# Patient Record
Sex: FEMALE | Race: WHITE | Employment: OTHER | URBAN - METROPOLITAN AREA
[De-identification: names, ages, dates, MRNs, and addresses within clinical notes are randomized per-mention and may not be internally consistent; named-entity substitution may affect disease eponyms.]

---

## 2017-02-08 ENCOUNTER — HOSPITAL ENCOUNTER (OUTPATIENT)
Dept: MAMMOGRAPHY | Age: 62
Discharge: HOME | End: 2017-02-08
Attending: OBSTETRICS & GYNECOLOGY
Payer: COMMERCIAL

## 2017-02-08 DIAGNOSIS — Z12.31 VISIT FOR SCREENING MAMMOGRAM: ICD-10-CM

## 2017-02-08 PROCEDURE — 77063 BREAST TOMOSYNTHESIS BI: CPT

## 2017-02-16 NOTE — PATIENT DISCUSSION
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   RETURN FOR FOLLOW-UP AS SCHEDULED FOR DILATED EYE EXAM.

## 2017-02-16 NOTE — PATIENT DISCUSSION
POSTERIOR VITREOUS DETACHMENT OD: NO HOLES, TEARS, OR RETINAL DETACHMENTS TODAY. ADVISED PT TO CALL IF ANY FLASHES OF LIGHT, INCREASE IN FLOATERS, OR DECREASE VISION IN EITHER EYE.

## 2017-04-04 ENCOUNTER — IMPORTED ENCOUNTER (OUTPATIENT)
Dept: URBAN - METROPOLITAN AREA CLINIC 1 | Facility: CLINIC | Age: 62
End: 2017-04-04

## 2017-04-04 PROBLEM — H16.143: Noted: 2017-04-04

## 2017-04-04 PROBLEM — H40.023: Noted: 2017-04-04

## 2017-04-04 PROBLEM — H25.813: Noted: 2017-04-04

## 2017-04-04 PROBLEM — H04.123: Noted: 2017-04-04

## 2017-04-04 PROBLEM — H35.3131: Noted: 2017-04-04

## 2017-04-04 PROCEDURE — 92004 COMPRE OPH EXAM NEW PT 1/>: CPT

## 2017-04-04 PROCEDURE — 92250 FUNDUS PHOTOGRAPHY W/I&R: CPT

## 2017-04-04 NOTE — PATIENT DISCUSSION
1.  Conjunctivitis OD (Chronic)- partial NLD obstruction. Start Tobradex OD TID. Continue Refresh tears OU TID. D/C Gilmar Soliman (prescribed else where.)  2. MEG w/ PEK OU: The use of artificial tears OU TID were recommended. D/C Gilmar Soliman (prescribed else where.)  3. Cataract OU: Observe for now without intervention. The patient was advised to contact us if any change or worsening of vision4. ARMD OU early/dry/NEW/ () FHX- mother. Importance of daily AREDS II study multivitamin and Amsler Grid checks discussed with patient. Patient to follow-up immediately with any new onset of decreased vision and/or metamorphopsia. 5. Glaucoma Suspect OU (0.8/0.75/ () FHX- mother): Stable IOP OU. Asymmetric cupping. Baseline disc photos taken today. Patient is considered high risk. Condition was discussed with patient and patient understands. Will continue to monitor patient for any progression in condition. Patient was advised to call us with any problems questions or concerns. 6.  H/o Lasik OU ([facility]): Done else where7. Return for an appointment for a 10/OCT/HVf in 3 weeks with Dr. Maryjo Mendenhall.

## 2017-05-09 ENCOUNTER — IMPORTED ENCOUNTER (OUTPATIENT)
Dept: URBAN - METROPOLITAN AREA CLINIC 1 | Facility: CLINIC | Age: 62
End: 2017-05-09

## 2017-05-09 PROBLEM — H40.023: Noted: 2017-05-09

## 2017-05-09 PROBLEM — H10.31: Noted: 2017-05-09

## 2017-05-09 PROBLEM — H16.143: Noted: 2017-05-09

## 2017-05-09 PROBLEM — H25.813: Noted: 2017-05-09

## 2017-05-09 PROBLEM — H04.123: Noted: 2017-05-09

## 2017-05-09 PROCEDURE — 92133 CPTRZD OPH DX IMG PST SGM ON: CPT

## 2017-05-09 PROCEDURE — 92083 EXTENDED VISUAL FIELD XM: CPT

## 2017-05-09 PROCEDURE — 92012 INTRM OPH EXAM EST PATIENT: CPT

## 2017-05-09 NOTE — PATIENT DISCUSSION
1.  Conjunctivitis OD (Chronic)- partial NLD obstruction resolved. D/c Tobradex. 2.  MEG w/ PEK OU and recent episodic reflex tearing- Increase ATs to TID OU routinely. Can increase to Q1hr OU w/a while on the computer. 3.  Cataract OU: Observe for now without intervention. The patient was advised to contact us if any change or worsening of vision4. H/o ARMD OU Early/Dry5. Glaucoma Suspect OU (0.8/0.75/ () FHX- Mother): Stable IOP OU. OCT/ VF WNL OU today. Continue to observe on no  meds. Asymmetric cupping. Patient considered High Risk. 6.  H/o L[facility] OU ([facility]): Done elsewhereReturn for an appointment in October 10 dfe with Dr. Mabel Arnett.

## 2017-10-24 ENCOUNTER — IMPORTED ENCOUNTER (OUTPATIENT)
Dept: URBAN - METROPOLITAN AREA CLINIC 1 | Facility: CLINIC | Age: 62
End: 2017-10-24

## 2017-10-24 PROBLEM — H25.813: Noted: 2017-10-24

## 2017-10-24 PROBLEM — H10.45: Noted: 2017-10-24

## 2017-10-24 PROBLEM — H35.363: Noted: 2017-10-24

## 2017-10-24 PROBLEM — H35.3131: Noted: 2017-10-24

## 2017-10-24 PROBLEM — H04.123: Noted: 2017-10-24

## 2017-10-24 PROBLEM — H40.023: Noted: 2017-10-24

## 2017-10-24 PROBLEM — H16.143: Noted: 2017-10-24

## 2017-10-24 PROCEDURE — 92012 INTRM OPH EXAM EST PATIENT: CPT

## 2017-10-24 PROCEDURE — 92015 DETERMINE REFRACTIVE STATE: CPT

## 2017-10-24 NOTE — PATIENT DISCUSSION
1.  Cataract OU: Visually Significant secondary to glare but will observe w/o intervention at this time. 2.  ARMD OU early/dry/stable. Importance of daily AREDS II study multivitamin and Amsler Grid checks discussed with patient. Patient to follow-up immediately with any new onset of decreased vision and/or metamorphopsia. 3. Glaucoma Suspect OU (0.8/0.75/ () FHX): Stable IOP and C/D OU. Past w/u (-). Patient is considered high risk. 4.  MEG w/ PEK OU- Stable. The use of artificial tears OU TID were recommended routinely. (Prescribed Doraine Shell else where in past.)5. Allergic Conjunctivitis OU- The condition was  discussed with the patient. Avoidance of allergens and cool compresses were recommended. 6. [facility]- [facility]. Pt interested in CTL's- Recommend daily CTL's. Will refer to Dr. Bradly Callaway for CTL fit. Recommend pt increase tears OU to QID while in CTLs. 8. Return for an appointment for a 30/OCT in 1 year with Dr. Marcy Mix.

## 2017-10-31 ENCOUNTER — IMPORTED ENCOUNTER (OUTPATIENT)
Dept: URBAN - METROPOLITAN AREA CLINIC 1 | Facility: CLINIC | Age: 62
End: 2017-10-31

## 2017-11-15 ENCOUNTER — IMPORTED ENCOUNTER (OUTPATIENT)
Dept: URBAN - METROPOLITAN AREA CLINIC 1 | Facility: CLINIC | Age: 62
End: 2017-11-15

## 2017-11-15 NOTE — PATIENT DISCUSSION
1.  CC today - wanting to improve comfort and reading vision OS. New trials given. Return for an appointment in 1 week cc after trial  with Dr. Micheline Patel.

## 2017-12-20 ENCOUNTER — IMPORTED ENCOUNTER (OUTPATIENT)
Dept: URBAN - METROPOLITAN AREA CLINIC 1 | Facility: CLINIC | Age: 62
End: 2017-12-20

## 2017-12-20 NOTE — PATIENT DISCUSSION
1.  CC today - Will order Acuvue Oasys 1 Day Toric CTL trials to improve comfort. Patient happy with vision Return for an appointment in 1-2 week cc after trial  with Dr. Octaviano Yao.

## 2018-03-30 ENCOUNTER — HOSPITAL ENCOUNTER (OUTPATIENT)
Dept: MAMMOGRAPHY | Age: 63
Discharge: HOME | End: 2018-03-30
Attending: OBSTETRICS & GYNECOLOGY
Payer: COMMERCIAL

## 2018-03-30 DIAGNOSIS — Z12.31 ENCOUNTER FOR SCREENING MAMMOGRAM FOR MALIGNANT NEOPLASM OF BREAST: ICD-10-CM

## 2018-03-30 PROCEDURE — 77063 BREAST TOMOSYNTHESIS BI: CPT

## 2018-12-18 ENCOUNTER — IMPORTED ENCOUNTER (OUTPATIENT)
Dept: URBAN - METROPOLITAN AREA CLINIC 1 | Facility: CLINIC | Age: 63
End: 2018-12-18

## 2018-12-18 PROBLEM — H40.013: Noted: 2018-12-18

## 2018-12-18 PROBLEM — H35.3131: Noted: 2018-12-18

## 2018-12-18 PROCEDURE — 92133 CPTRZD OPH DX IMG PST SGM ON: CPT

## 2018-12-18 PROCEDURE — 92014 COMPRE OPH EXAM EST PT 1/>: CPT

## 2018-12-18 PROCEDURE — 92015 DETERMINE REFRACTIVE STATE: CPT

## 2018-12-18 NOTE — PATIENT DISCUSSION
1.  ARMD OU Early/dry/stable. Importance of daily AREDS II study multivitamin and Amsler Grid checks discussed with patient. Patient to follow-up immediately with any new onset of decreased vision and/or metamorphopsia. 2. Glaucoma Suspect OU (0.8/0.75/ () FHX): Stable IOP and C/D OU. Past w/u (-). OCT shows no progression OU. Cont to observe on no gtts. Patient is considered high risk. 3.  MEG w/ PEK OU- Use ATs TID OU Routinely. 4.  Allergic Conjunctivitis OU- controlled. Observe. 5.  [facility] OU- [facility] 6. H/o CL Wear- Could consider Dailies Total One PRN. Letter to PCP Return for an appointment in 6 mo 10 dfe with Dr. Cody Sheikh.

## 2019-01-15 NOTE — PATIENT DISCUSSION
New Prescription: erythromycin (erythromycin): ointment: 5 mg/gram (0.5 %) a small amount twice a day as directed into both eyes 01-

## 2019-01-15 NOTE — PATIENT DISCUSSION
Cosmetic Dermatochalasis ou: Not visually significant enough for insurance to cover surgery. Patient understands Blepharoplasty will be preformed for cosmetic reasons and understands payment is an out of pocket expense. Patient elects to proceed with Blepharoplasty, discussed with patient risks, benefits, inflammation, infection and bleeding.

## 2019-04-24 ENCOUNTER — HOSPITAL ENCOUNTER (OUTPATIENT)
Dept: MAMMOGRAPHY | Age: 64
Discharge: HOME | End: 2019-04-24
Attending: OBSTETRICS & GYNECOLOGY
Payer: COMMERCIAL

## 2019-04-24 DIAGNOSIS — Z12.31 VISIT FOR SCREENING MAMMOGRAM: ICD-10-CM

## 2019-04-24 PROCEDURE — 77063 BREAST TOMOSYNTHESIS BI: CPT

## 2019-06-28 ENCOUNTER — IMPORTED ENCOUNTER (OUTPATIENT)
Dept: URBAN - METROPOLITAN AREA CLINIC 1 | Facility: CLINIC | Age: 64
End: 2019-06-28

## 2019-06-28 PROBLEM — H40.023: Noted: 2019-06-28

## 2019-06-28 PROBLEM — H35.3131: Noted: 2019-06-28

## 2019-06-28 PROCEDURE — 92012 INTRM OPH EXAM EST PATIENT: CPT

## 2019-06-28 NOTE — PATIENT DISCUSSION
1.  ARMD OU Early/dry/stable. Importance of daily AREDS II study multivitamin and Amsler Grid checks discussed with patient. Patient to follow-up immediately with any new onset of decreased vision and/or metamorphopsia. 2. Glaucoma Suspect OU (0.8/0.70/ () FHX. IOP stable on no gtts. Past w/u negative. Cont to observe off gtts. 3.  MEG w/ PEK OU- Continue ATs TID OU Routinely. 4.  Allergic Conjunctivitis OU- controlled. Observe. 5.  H/o LASIK OU- [facility] 6. H/o CL Wear- Could consider Dailies Total One PRN. Return for an appointment in 6 mo 30 with Dr. Elva Garsia.

## 2019-08-09 ENCOUNTER — HOSPITAL ENCOUNTER (OUTPATIENT)
Dept: LAB | Age: 64
Discharge: HOME | End: 2019-08-09
Payer: COMMERCIAL

## 2019-08-09 ENCOUNTER — HOSPITAL ENCOUNTER (OUTPATIENT)
Dept: LAB | Age: 64
Discharge: HOME | End: 2019-08-09

## 2019-08-09 DIAGNOSIS — N39.3 FEMALE STRESS INCONTINENCE: ICD-10-CM

## 2019-08-09 LAB — SENTARA SPECIMEN COL,SENBCF: NORMAL

## 2019-08-09 PROCEDURE — 93005 ELECTROCARDIOGRAM TRACING: CPT

## 2019-08-09 PROCEDURE — 36415 COLL VENOUS BLD VENIPUNCTURE: CPT

## 2019-08-09 PROCEDURE — 99001 SPECIMEN HANDLING PT-LAB: CPT

## 2019-08-11 LAB
ATRIAL RATE: 66 BPM
CALCULATED P AXIS, ECG09: 63 DEGREES
CALCULATED R AXIS, ECG10: -48 DEGREES
CALCULATED T AXIS, ECG11: 66 DEGREES
DIAGNOSIS, 93000: NORMAL
P-R INTERVAL, ECG05: 146 MS
Q-T INTERVAL, ECG07: 422 MS
QRS DURATION, ECG06: 86 MS
QTC CALCULATION (BEZET), ECG08: 442 MS
VENTRICULAR RATE, ECG03: 66 BPM

## 2019-09-26 NOTE — PATIENT DISCUSSION
MACULAR HOLE, OD:  MACULAR HOLE OD, PT DOES NOT WISH TO PROCEED WITH SURGERY AT THIS TIME. RETURN FOR FOLLOW-UP AS SCHEDULED.

## 2020-01-06 ENCOUNTER — IMPORTED ENCOUNTER (OUTPATIENT)
Dept: URBAN - METROPOLITAN AREA CLINIC 1 | Facility: CLINIC | Age: 65
End: 2020-01-06

## 2020-01-06 PROBLEM — H16.143: Noted: 2020-01-06

## 2020-01-06 PROBLEM — H04.123: Noted: 2020-01-06

## 2020-01-06 PROBLEM — H35.3131: Noted: 2020-01-06

## 2020-01-06 PROBLEM — H25.813: Noted: 2020-01-06

## 2020-01-06 PROBLEM — H40.013: Noted: 2020-01-06

## 2020-01-06 PROCEDURE — 92014 COMPRE OPH EXAM EST PT 1/>: CPT

## 2020-01-06 PROCEDURE — 92015 DETERMINE REFRACTIVE STATE: CPT

## 2020-01-06 NOTE — PATIENT DISCUSSION
1.  ARMD OU Early/dry/stable. Importance of daily AREDS II study multivitamin and Amsler Grid checks discussed with patient. Patient to follow-up immediately with any new onset of decreased vision and/or metamorphopsia. 2. Glaucoma Suspect OU (0.8/0.70/ () FHX. IOP stable on no gtts. Past w/u negative. Cont to observe off gtts. 3.  MEG w/ PEK OU -- Continue ATs TID OU Routinely. 4.  Catarcts OU -- Observe. 5. Allergic Conjunctivitis OU -- Controlled. Observe. 6.  H/o LASIK OU- [facility] 7. H/o CL Wear- Could consider Dailies Total One PRN. Return for an appointment in 6 months for a 10/dfe/glare with Dr. Mabel Arnett.

## 2020-01-13 ENCOUNTER — HOSPITAL ENCOUNTER (OUTPATIENT)
Dept: GENERAL RADIOLOGY | Age: 65
Discharge: HOME | End: 2020-01-13
Payer: COMMERCIAL

## 2020-01-13 DIAGNOSIS — R05.9 COUGH: ICD-10-CM

## 2020-01-13 DIAGNOSIS — J20.9 ACUTE BRONCHITIS: ICD-10-CM

## 2020-01-13 PROCEDURE — 71046 X-RAY EXAM CHEST 2 VIEWS: CPT

## 2020-06-29 ENCOUNTER — HOSPITAL ENCOUNTER (OUTPATIENT)
Dept: MAMMOGRAPHY | Age: 65
Discharge: HOME | End: 2020-06-29
Attending: OBSTETRICS & GYNECOLOGY
Payer: COMMERCIAL

## 2020-06-29 DIAGNOSIS — Z12.31 VISIT FOR SCREENING MAMMOGRAM: ICD-10-CM

## 2020-06-29 PROCEDURE — 77063 BREAST TOMOSYNTHESIS BI: CPT

## 2020-09-15 ENCOUNTER — IMPORTED ENCOUNTER (OUTPATIENT)
Dept: URBAN - METROPOLITAN AREA CLINIC 1 | Facility: CLINIC | Age: 65
End: 2020-09-15

## 2020-09-15 PROBLEM — D23.12: Noted: 2020-09-15

## 2020-09-15 PROBLEM — H40.013: Noted: 2020-09-15

## 2020-09-15 PROBLEM — H04.123: Noted: 2020-09-15

## 2020-09-15 PROCEDURE — 92012 INTRM OPH EXAM EST PATIENT: CPT

## 2020-09-15 NOTE — PATIENT DISCUSSION
1.  Lesion lower lid benign OS - The patient has a lesion of the left lower eyelid which appears benign. Measurements were taken and observation at regular intervals was recommended. F/U with PMG2. Glaucoma Suspect OU :  Patient is considered Low Risk. Condition was discussed with patient and patient understands. Will continue to monitor patient for any progression in condition. Patient was advised to call us with any problems questions or concerns. 3.  Dry Eyes OU -The use/continuation of artificial tears were recommended. 4.  Keep appt with PMG.

## 2020-10-19 ENCOUNTER — IMPORTED ENCOUNTER (OUTPATIENT)
Dept: URBAN - METROPOLITAN AREA CLINIC 1 | Facility: CLINIC | Age: 65
End: 2020-10-19

## 2020-10-19 PROBLEM — D22.12: Noted: 2020-10-19

## 2020-10-19 PROBLEM — H35.3131: Noted: 2020-10-19

## 2020-10-19 PROBLEM — H40.013: Noted: 2020-10-19

## 2020-10-19 PROBLEM — H04.123: Noted: 2020-10-19

## 2020-10-19 PROBLEM — H25.813: Noted: 2020-10-19

## 2020-10-19 PROBLEM — H35.3132: Noted: 2020-10-19

## 2020-10-19 PROBLEM — H16.143: Noted: 2020-10-19

## 2020-10-19 PROCEDURE — 92012 INTRM OPH EXAM EST PATIENT: CPT

## 2020-10-19 NOTE — PATIENT DISCUSSION
Lesion lower lid benign OS - The patient has a lesion of the left lower eyelid which appears benign. The patient requested that the lesion be removed which will be done as an in-office surgical procedure.

## 2020-10-19 NOTE — PATIENT DISCUSSION
ARMD OU intermediate/dry/stable. Importance of daily AREDS II study multivitamin and Amsler Grid checks discussed with patient. Patient to follow-up immediately with any new onset of decreased vision and/or metamorphopsia.

## 2020-11-09 ENCOUNTER — IMPORTED ENCOUNTER (OUTPATIENT)
Dept: URBAN - METROPOLITAN AREA CLINIC 1 | Facility: CLINIC | Age: 65
End: 2020-11-09

## 2020-11-09 PROCEDURE — 67840 REMOVE EYELID LESION: CPT

## 2020-11-09 NOTE — PATIENT DISCUSSION
Excision of lesion on  left lower eyelid: After proper informed consent was obtained the patient was taken to the operating room and placed supine on the operating table. The right eye was then prepped in the standard surgical fashion. Attention was then turned to the left lower eyelid where one percent Xylocaine with Epinephrine was infiltrated under the lesion. A scalpel was then to make an elliptical incision around the lesion and Lynn scissors were used to excise the mass free. Cautery was used for hemostasis and ointment was applied. The wound was small and therefore left to heal by secondary intention. The patient tolerated the procedure well and there were no complications. The patient was instructed to use Maxitrol Gerard BID LLL x 1 week then DC (erx). Return for an appointment for Return as scheduled with Dr. Elva Garsia.

## 2020-11-11 PROBLEM — D23.12: Noted: 2020-11-11

## 2021-04-08 ENCOUNTER — IMPORTED ENCOUNTER (OUTPATIENT)
Dept: URBAN - METROPOLITAN AREA CLINIC 1 | Facility: CLINIC | Age: 66
End: 2021-04-08

## 2021-04-08 PROBLEM — H25.813: Noted: 2021-04-08

## 2021-04-08 PROBLEM — H04.123: Noted: 2021-04-08

## 2021-04-08 PROBLEM — H16.143: Noted: 2021-04-08

## 2021-04-08 PROBLEM — H35.3131: Noted: 2021-04-08

## 2021-04-08 PROCEDURE — 99214 OFFICE O/P EST MOD 30 MIN: CPT

## 2021-04-08 NOTE — PATIENT DISCUSSION
1.  ARMD OU Early/dry/stable. Importance of daily AREDS II study multivitamin and Amsler Grid checks discussed with patient. Patient to follow-up immediately with any new onset of decreased vision and/or metamorphopsia. 2. Cataract OU: Observe for now without intervention. The patient was advised to contact us if any change or worsening of vision3. MEG w/ PEK OU- Recommend ATs TID OU routinely 4. Glaucoma Suspect OU (0.8/0.70) - () FHX. IOP stable on no gtts. Past w/u negative. Cont to observe off gtts. 5.  Allergic Conjunctivitis OU - Controlled. Observe. 6.  S/p Excision LLL 7. H/o LASIK Monovision OU- Likely previous moderate Myope 8. H/o CL Wear- Could consider Dailies Total One PRN. Return for an appointment in 6 months DFE with Dr. Xiomara Puente.

## 2021-09-16 ENCOUNTER — TRANSCRIBE ORDER (OUTPATIENT)
Dept: SCHEDULING | Age: 66
End: 2021-09-16

## 2021-09-16 DIAGNOSIS — Z12.31 SCREENING MAMMOGRAM FOR HIGH-RISK PATIENT: Primary | ICD-10-CM

## 2021-09-27 ENCOUNTER — HOSPITAL ENCOUNTER (OUTPATIENT)
Dept: WOMENS IMAGING | Age: 66
Discharge: HOME | End: 2021-09-27
Attending: OBSTETRICS & GYNECOLOGY
Payer: COMMERCIAL

## 2021-09-27 DIAGNOSIS — Z12.31 SCREENING MAMMOGRAM FOR HIGH-RISK PATIENT: ICD-10-CM

## 2021-09-27 PROCEDURE — 77063 BREAST TOMOSYNTHESIS BI: CPT

## 2021-10-14 ENCOUNTER — IMPORTED ENCOUNTER (OUTPATIENT)
Dept: URBAN - METROPOLITAN AREA CLINIC 1 | Facility: CLINIC | Age: 66
End: 2021-10-14

## 2021-10-14 PROBLEM — H35.3131: Noted: 2021-10-14

## 2021-10-14 PROCEDURE — 92015 DETERMINE REFRACTIVE STATE: CPT

## 2021-10-14 PROCEDURE — 92012 INTRM OPH EXAM EST PATIENT: CPT

## 2021-10-14 NOTE — PATIENT DISCUSSION
1.  ARMD OU -- Early/dry/stable. Importance of daily AREDS II study multivitamin and Amsler Grid checks discussed with patient. Patient to follow-up immediately with any new onset of decreased vision and/or metamorphopsia. 2. Cataract OU -- Visually significant secondary to glare however will hold off on surgical intervention at this time per patient request. The patient was advised to contact us if any change or worsening of vision3. MEG w/ PEK OU -- Recommend ATs TID OU routinely. 4.  Glaucoma Suspect OU -- (CD 0.80/0.70) IOP stable on no gtts. () Family Hx (Mother). Past w/u negative. Cont to observe off gtts. Patient is considered Low Risk. 5.  Allergic Conjunctivitis OU -- Recommended OTC Pataday QD OU PRN itching. 6.  S/p Excision LLL 7. H/o LASIK Monovision OU -- Likely previous moderate Myope. 8.  H/o CTL Wear -- Could consider Dailies Total One PRN. Return for an appointment in 6 months 30/MacOCT/Glare with Dr. Lj Torres.

## 2022-04-02 ASSESSMENT — VISUAL ACUITY
OS_GLARE: 20/50
OS_SC: J3
OS_SC: J2
OD_CC: 20/25
OS_SC: J1
OD_CC: 20/40
OS_SC: 20/20
OS_SC: J3
OD_GLARE: 20/60
OS_GLARE: 20/60
OD_GLARE: 20/50
OD_CC: 20/25
OD_SC: 20/20
OD_CC: 20/25
OS_SC: J2
OS_CC: 20/50
OD_SC: 20/20
OD_CC: 20/20
OS_CC: J2
OD_GLARE: 20/70
OS_SC: J1
OS_GLARE: 20/70
OS_CC: 20/50
OS_CC: J2
OS_SC: 20/20-2
OS_CC: 20/50
OS_SC: 20/25
OD_SC: 20/20-2
OD_CC: 20/20
OD_SC: 20/20-1
OD_SC: 20/25
OS_SC: 20/30
OS_SC: J1
OD_CC: 20/25-2
OD_SC: 20/20

## 2022-04-02 ASSESSMENT — KERATOMETRY
OD_K2POWER_DIOPTERS: 38.75
OD_K1POWER_DIOPTERS: 37.75
OS_K1POWER_DIOPTERS: 40.00
OS_AXISANGLE2_DEGREES: 021
OS_K2POWER_DIOPTERS: 40.50
OD_AXISANGLE2_DEGREES: 010

## 2022-04-02 ASSESSMENT — TONOMETRY
OD_IOP_MMHG: 17
OS_IOP_MMHG: 17
OD_IOP_MMHG: 17
OD_IOP_MMHG: 17
OD_IOP_MMHG: 15
OS_IOP_MMHG: 17
OD_IOP_MMHG: 18
OD_IOP_MMHG: 18
OS_IOP_MMHG: 17
OS_IOP_MMHG: 16
OS_IOP_MMHG: 17
OS_IOP_MMHG: 21
OS_IOP_MMHG: 15
OD_IOP_MMHG: 21
OS_IOP_MMHG: 18
OD_IOP_MMHG: 18
OD_IOP_MMHG: 17
OD_IOP_MMHG: 18
OS_IOP_MMHG: 17
OS_IOP_MMHG: 18

## 2022-04-21 ENCOUNTER — COMPREHENSIVE EXAM (OUTPATIENT)
Dept: URBAN - METROPOLITAN AREA CLINIC 1 | Facility: CLINIC | Age: 67
End: 2022-04-21

## 2022-04-21 DIAGNOSIS — H25.813: ICD-10-CM

## 2022-04-21 DIAGNOSIS — H04.123: ICD-10-CM

## 2022-04-21 DIAGNOSIS — H35.3131: ICD-10-CM

## 2022-04-21 DIAGNOSIS — H10.45: ICD-10-CM

## 2022-04-21 DIAGNOSIS — H40.013: ICD-10-CM

## 2022-04-21 PROCEDURE — 92134 CPTRZ OPH DX IMG PST SGM RTA: CPT

## 2022-04-21 PROCEDURE — 92014 COMPRE OPH EXAM EST PT 1/>: CPT

## 2022-04-21 ASSESSMENT — TONOMETRY
OD_IOP_MMHG: 22
OS_IOP_MMHG: 21

## 2022-04-21 ASSESSMENT — VISUAL ACUITY
OS_CC: J1+
OD_BAT: 20/50
OS_CC: 20/20
OD_CC: J1+
OS_BAT: 20/50
OD_CC: 20/20

## 2022-04-21 NOTE — PATIENT DISCUSSION
(CD 0.80/0.70) -  IOP stable on no gtts 22/21 today. (+) Family Hx (Mother). Past w/u negative. Cont to observe off gtts.

## 2022-12-06 ENCOUNTER — TRANSCRIBE ORDER (OUTPATIENT)
Dept: SCHEDULING | Age: 67
End: 2022-12-06

## 2022-12-06 DIAGNOSIS — R91.1 COIN LESION: Primary | ICD-10-CM

## 2023-01-11 ENCOUNTER — FOLLOW UP (OUTPATIENT)
Dept: URBAN - METROPOLITAN AREA CLINIC 1 | Facility: CLINIC | Age: 68
End: 2023-01-11

## 2023-01-11 DIAGNOSIS — H40.013: ICD-10-CM

## 2023-01-11 DIAGNOSIS — H35.3131: ICD-10-CM

## 2023-01-11 PROCEDURE — 92014 COMPRE OPH EXAM EST PT 1/>: CPT

## 2023-01-11 ASSESSMENT — VISUAL ACUITY
OD_CC: J1
OD_CC: 20/20
OS_CC: 20/20
OS_CC: J1

## 2023-01-11 ASSESSMENT — TONOMETRY
OS_IOP_MMHG: 18
OD_IOP_MMHG: 18

## 2023-01-11 NOTE — PATIENT DISCUSSION
(CD 0.80/0.70) -  IOP stable on no gtts. (+) Family Hx (Mother). Past w/u negative (will repeat OCT at next visit with PMG). Cont to observe off gtts.

## 2023-01-11 NOTE — PATIENT DISCUSSION
Stable. Importance of daily AREDS II study multivitamin and Amsler Grid checks discussed with patient. Patient to follow-up immediately with any new onset of decreased vision and/or metamorphopsia.

## 2023-05-02 ENCOUNTER — COMPREHENSIVE EXAM (OUTPATIENT)
Dept: URBAN - METROPOLITAN AREA CLINIC 1 | Facility: CLINIC | Age: 68
End: 2023-05-02

## 2023-05-02 DIAGNOSIS — H35.3131: ICD-10-CM

## 2023-05-02 DIAGNOSIS — H10.45: ICD-10-CM

## 2023-05-02 DIAGNOSIS — H43.812: ICD-10-CM

## 2023-05-02 DIAGNOSIS — H25.813: ICD-10-CM

## 2023-05-02 DIAGNOSIS — H40.013: ICD-10-CM

## 2023-05-02 DIAGNOSIS — H04.123: ICD-10-CM

## 2023-05-02 PROCEDURE — 99214 OFFICE O/P EST MOD 30 MIN: CPT

## 2023-05-02 PROCEDURE — 92133 CPTRZD OPH DX IMG PST SGM ON: CPT

## 2023-05-02 ASSESSMENT — TONOMETRY
OS_IOP_MMHG: 19
OD_IOP_MMHG: 19

## 2023-05-02 ASSESSMENT — VISUAL ACUITY
OD_CC: 20/20-1
OS_CC: 20/25
OD_BAT: 20/50
OS_BAT: 20/50
OU_CC: J1+

## 2023-11-09 ENCOUNTER — FOLLOW UP (OUTPATIENT)
Dept: URBAN - METROPOLITAN AREA CLINIC 1 | Facility: CLINIC | Age: 68
End: 2023-11-09

## 2023-11-09 DIAGNOSIS — H25.813: ICD-10-CM

## 2023-11-09 DIAGNOSIS — H35.3131: ICD-10-CM

## 2023-11-09 PROCEDURE — 99213 OFFICE O/P EST LOW 20 MIN: CPT

## 2023-11-09 ASSESSMENT — VISUAL ACUITY
OS_CC: J1+
OD_CC: 20/25
OS_CC: 20/25
OD_CC: J1+

## 2023-11-09 ASSESSMENT — TONOMETRY
OD_IOP_MMHG: 17
OS_IOP_MMHG: 18

## 2024-05-06 ENCOUNTER — COMPREHENSIVE EXAM (OUTPATIENT)
Dept: URBAN - METROPOLITAN AREA CLINIC 1 | Facility: CLINIC | Age: 69
End: 2024-05-06

## 2024-05-06 DIAGNOSIS — H35.3131: ICD-10-CM

## 2024-05-06 DIAGNOSIS — H25.813: ICD-10-CM

## 2024-05-06 DIAGNOSIS — H40.013: ICD-10-CM

## 2024-05-06 DIAGNOSIS — H43.813: ICD-10-CM

## 2024-05-06 PROCEDURE — 92133 CPTRZD OPH DX IMG PST SGM ON: CPT

## 2024-05-06 PROCEDURE — 92014 COMPRE OPH EXAM EST PT 1/>: CPT

## 2024-05-06 ASSESSMENT — VISUAL ACUITY
OU_CC: 20/20
OS_CC: 20/25
OD_CC: 20/25
OS_BAT: 20/20
OD_BAT: 20/20

## 2024-05-06 ASSESSMENT — TONOMETRY
OD_IOP_MMHG: 16
OS_IOP_MMHG: 18

## 2024-11-22 ENCOUNTER — FOLLOW UP (OUTPATIENT)
Dept: URBAN - METROPOLITAN AREA CLINIC 1 | Facility: CLINIC | Age: 69
End: 2024-11-22

## 2024-11-22 DIAGNOSIS — H25.813: ICD-10-CM

## 2024-11-22 DIAGNOSIS — H35.3131: ICD-10-CM

## 2024-11-22 PROCEDURE — 92012 INTRM OPH EXAM EST PATIENT: CPT

## 2024-11-22 PROCEDURE — 92015 DETERMINE REFRACTIVE STATE: CPT

## 2024-11-22 PROCEDURE — 92134 CPTRZ OPH DX IMG PST SGM RTA: CPT

## 2025-04-20 ENCOUNTER — TRANSCRIBE ORDERS (OUTPATIENT)
Facility: HOSPITAL | Age: 70
End: 2025-04-20

## 2025-04-20 DIAGNOSIS — C34.12 MALIGNANT NEOPLASM OF UPPER LOBE, LEFT BRONCHUS OR LUNG (HCC): Primary | ICD-10-CM

## 2025-06-02 ENCOUNTER — COMPREHENSIVE EXAM (OUTPATIENT)
Age: 70
End: 2025-06-02

## 2025-06-02 DIAGNOSIS — H25.813: ICD-10-CM

## 2025-06-02 DIAGNOSIS — H35.3131: ICD-10-CM

## 2025-06-02 DIAGNOSIS — H40.023: ICD-10-CM

## 2025-06-02 PROCEDURE — 99214 OFFICE O/P EST MOD 30 MIN: CPT

## 2025-06-02 PROCEDURE — 92015 DETERMINE REFRACTIVE STATE: CPT

## 2025-06-02 PROCEDURE — 92133 CPTRZD OPH DX IMG PST SGM ON: CPT

## 2025-06-02 PROCEDURE — 92134 CPTRZ OPH DX IMG PST SGM RTA: CPT
